# Patient Record
Sex: FEMALE | Race: OTHER | NOT HISPANIC OR LATINO
[De-identification: names, ages, dates, MRNs, and addresses within clinical notes are randomized per-mention and may not be internally consistent; named-entity substitution may affect disease eponyms.]

---

## 2017-12-26 PROBLEM — Z00.00 ENCOUNTER FOR PREVENTIVE HEALTH EXAMINATION: Status: ACTIVE | Noted: 2017-12-26

## 2018-01-18 ENCOUNTER — APPOINTMENT (OUTPATIENT)
Dept: OTOLARYNGOLOGY | Facility: CLINIC | Age: 63
End: 2018-01-18
Payer: COMMERCIAL

## 2018-01-18 VITALS
DIASTOLIC BLOOD PRESSURE: 87 MMHG | HEIGHT: 62 IN | BODY MASS INDEX: 25.58 KG/M2 | WEIGHT: 139 LBS | SYSTOLIC BLOOD PRESSURE: 133 MMHG | HEART RATE: 79 BPM

## 2018-01-18 DIAGNOSIS — Z78.9 OTHER SPECIFIED HEALTH STATUS: ICD-10-CM

## 2018-01-18 PROCEDURE — 31231 NASAL ENDOSCOPY DX: CPT

## 2018-01-18 PROCEDURE — 99204 OFFICE O/P NEW MOD 45 MIN: CPT | Mod: 25

## 2018-01-25 ENCOUNTER — FORM ENCOUNTER (OUTPATIENT)
Age: 63
End: 2018-01-25

## 2018-01-26 ENCOUNTER — OUTPATIENT (OUTPATIENT)
Dept: OUTPATIENT SERVICES | Facility: HOSPITAL | Age: 63
LOS: 1 days | End: 2018-01-26
Payer: COMMERCIAL

## 2018-01-26 ENCOUNTER — APPOINTMENT (OUTPATIENT)
Dept: CT IMAGING | Facility: CLINIC | Age: 63
End: 2018-01-26
Payer: COMMERCIAL

## 2018-01-26 DIAGNOSIS — R51 HEADACHE: ICD-10-CM

## 2018-01-26 PROCEDURE — 70486 CT MAXILLOFACIAL W/O DYE: CPT

## 2018-01-26 PROCEDURE — 70486 CT MAXILLOFACIAL W/O DYE: CPT | Mod: 26

## 2018-02-07 ENCOUNTER — APPOINTMENT (OUTPATIENT)
Dept: OTOLARYNGOLOGY | Facility: CLINIC | Age: 63
End: 2018-02-07

## 2018-03-15 ENCOUNTER — APPOINTMENT (OUTPATIENT)
Dept: NEUROLOGY | Facility: CLINIC | Age: 63
End: 2018-03-15

## 2020-01-26 ENCOUNTER — TRANSCRIPTION ENCOUNTER (OUTPATIENT)
Age: 65
End: 2020-01-26

## 2022-02-11 ENCOUNTER — APPOINTMENT (OUTPATIENT)
Dept: OTOLARYNGOLOGY | Facility: CLINIC | Age: 67
End: 2022-02-11
Payer: COMMERCIAL

## 2022-02-11 VITALS
HEIGHT: 61 IN | SYSTOLIC BLOOD PRESSURE: 122 MMHG | WEIGHT: 136 LBS | DIASTOLIC BLOOD PRESSURE: 79 MMHG | BODY MASS INDEX: 25.68 KG/M2 | HEART RATE: 80 BPM

## 2022-02-11 DIAGNOSIS — J35.01 CHRONIC TONSILLITIS: ICD-10-CM

## 2022-02-11 PROCEDURE — 99244 OFF/OP CNSLTJ NEW/EST MOD 40: CPT | Mod: 25

## 2022-02-11 PROCEDURE — 31231 NASAL ENDOSCOPY DX: CPT

## 2022-02-11 RX ORDER — HYDROCORTISONE 0.5 G/100G
0.5 CREAM TOPICAL
Refills: 0 | Status: DISCONTINUED | COMMUNITY
End: 2022-02-11

## 2022-02-11 RX ORDER — GABAPENTIN 300 MG/1
300 CAPSULE ORAL
Refills: 0 | Status: DISCONTINUED | COMMUNITY
End: 2022-02-11

## 2022-02-11 RX ORDER — METHYLPREDNISOLONE 4 MG/1
4 TABLET ORAL
Qty: 1 | Refills: 0 | Status: DISCONTINUED | COMMUNITY
Start: 2018-01-18 | End: 2022-02-11

## 2022-02-11 RX ORDER — ACYCLOVIR 400 MG/1
400 TABLET ORAL
Refills: 0 | Status: DISCONTINUED | COMMUNITY
End: 2022-02-11

## 2022-02-11 RX ORDER — MELOXICAM 15 MG/1
15 TABLET ORAL
Refills: 0 | Status: DISCONTINUED | COMMUNITY
End: 2022-02-11

## 2022-02-11 RX ORDER — MUPIROCIN 20 MG/G
2 OINTMENT TOPICAL
Refills: 0 | Status: DISCONTINUED | COMMUNITY
End: 2022-02-11

## 2022-02-11 RX ORDER — KETOCONAZOLE 20 MG/G
2 CREAM TOPICAL
Refills: 0 | Status: DISCONTINUED | COMMUNITY
End: 2022-02-11

## 2022-02-11 RX ORDER — PRAVASTATIN SODIUM 20 MG/1
20 TABLET ORAL
Refills: 0 | Status: DISCONTINUED | COMMUNITY
End: 2022-02-11

## 2022-02-11 NOTE — HISTORY OF PRESENT ILLNESS
[de-identified] : The patient presents today for sinus check up. Pt reports having clear mucus discharge from nose. Pt states seeing a neurologist for this issue and was told she should see an ENT because discharge might be because of her sinuses. Pt reports having MRI done recently but doesn’t have the report with her.

## 2022-02-11 NOTE — ADDENDUM
[FreeTextEntry1] : Documented by Saranya Moss acting as scribe for Dr. Ware on 02/11/2022. \par \par All Medical record entries made by the scribe were at my. Dr. Ware direction and personally dictated by me on 02/11/2022. I have reviewed the chart and agree that the record accurately reflects my personal performance of the history, physical exam, assessment and plan. I have also personally directed, reviewed, and agreed with the discharge instructions.

## 2022-02-11 NOTE — PHYSICAL EXAM
[Hearing Lieberman Test (Tuning Fork On Forehead)] : no lateralization of tone [Midline] : trachea located in midline position [Normal] : no abnormal secretions [Hearing Loss Right Only] : normal [Hearing Loss Left Only] : normal [FreeTextEntry6] : little irritation around lobule where her earing is  [FreeTextEntry1] : mild deviated septum\par mild inflamed turbs  [de-identified] : class 2 little cryptic  [FreeTextEntry2] : sinuses nontender to percussion sensations intact

## 2022-02-11 NOTE — CONSULT LETTER
[Dear  ___] : Dear  [unfilled], [Courtesy Letter:] : I had the pleasure of seeing your patient, [unfilled], in my office today. [Please see my note below.] : Please see my note below. [Consult Closing:] : Thank you very much for allowing me to participate in the care of this patient.  If you have any questions, please do not hesitate to contact me. [Sincerely,] : Sincerely, [FreeTextEntry3] : Salvatore Ware MD FACS

## 2022-02-11 NOTE — ASSESSMENT
[FreeTextEntry1] : Reviewed and reconciled medications, allergies, PMHx, PSHx, SocHx, FMHx. 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 \par \par Plan:\par Previous MRI and CT sinus reports discussed. Flexible nasal endoscopy. CT sinus ordered. FU after results\par \par \par \par \par

## 2022-02-11 NOTE — PROCEDURE
[Recalcitrant Symptoms] : recalcitrant symptoms  [FreeTextEntry6] : Procedure: Flexible Nasal Endoscopy: Risks, benefits, and alternatives of flexible endoscopy were explained to the patient. The patient gave oral consent to proceed. The flexible scope was inserted into the right nasal cavity. Endoscopy of the inferior and middle meatus was performed. Left side large middle turb, deviated septum pinching on the turb. Right side same thing large middle turb. Mildly inflamed lateral wall.No polyp, mass, or lesion was appreciated. Olfactory cleft was clear. Spheno-ethmoid recess is clear. Nasopharynx was clear. 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 The procedure was repeated on the contralateral side with similar findings.

## 2022-02-14 ENCOUNTER — APPOINTMENT (OUTPATIENT)
Dept: CT IMAGING | Facility: CLINIC | Age: 67
End: 2022-02-14
Payer: COMMERCIAL

## 2022-02-14 ENCOUNTER — RESULT REVIEW (OUTPATIENT)
Age: 67
End: 2022-02-14

## 2022-02-14 ENCOUNTER — OUTPATIENT (OUTPATIENT)
Dept: OUTPATIENT SERVICES | Facility: HOSPITAL | Age: 67
LOS: 1 days | End: 2022-02-14
Payer: COMMERCIAL

## 2022-02-14 DIAGNOSIS — R09.81 NASAL CONGESTION: ICD-10-CM

## 2022-02-14 DIAGNOSIS — R20.0 ANESTHESIA OF SKIN: ICD-10-CM

## 2022-02-14 DIAGNOSIS — J34.2 DEVIATED NASAL SEPTUM: ICD-10-CM

## 2022-02-14 PROCEDURE — 70486 CT MAXILLOFACIAL W/O DYE: CPT | Mod: 26

## 2022-02-14 PROCEDURE — 70486 CT MAXILLOFACIAL W/O DYE: CPT

## 2022-03-07 ENCOUNTER — APPOINTMENT (OUTPATIENT)
Dept: OTOLARYNGOLOGY | Facility: CLINIC | Age: 67
End: 2022-03-07
Payer: COMMERCIAL

## 2022-03-07 VITALS
SYSTOLIC BLOOD PRESSURE: 137 MMHG | WEIGHT: 135 LBS | HEIGHT: 61 IN | BODY MASS INDEX: 25.49 KG/M2 | HEART RATE: 77 BPM | DIASTOLIC BLOOD PRESSURE: 83 MMHG

## 2022-03-07 PROCEDURE — 99214 OFFICE O/P EST MOD 30 MIN: CPT

## 2022-03-07 NOTE — ADDENDUM
[FreeTextEntry1] : Documented by Fili Fan acting as a scribe for Dr. Salvatore Ware on (03/07/2022).\par \par All medical record entries made by the Scribe were at my, Dr. Salvatore Ware's, direction and personally dictated by me on (03/07/2022). I have reviewed the chart and agree that the record accurately reflects my personal performance of the history, physical exam, assessment and plan. I have also personally directed, reviewed, and agree with the discharge instructions.\par \par

## 2022-03-07 NOTE — HISTORY OF PRESENT ILLNESS
[de-identified] : The patient presents with continue clear mucoid discharge on her previous visit, s/p nasal endoscopy which noted max sinus through accessory osseum. Pt presents to discuss recent ct sinus results. Pt is currently using sinus rinse regularly.

## 2022-03-07 NOTE — ASSESSMENT
[FreeTextEntry1] : Reviewed and reconciled medications, allergies, PMHx, PSHx, SocHx, FMHx.\par \par \par CT sinus 2/14/2022: Mild mucosal thickening of the paranasal sinuses. Nasal septal deviation to the left. Narrowing of the frontal recesses which may predispose to sinus outflow obstruction. no polyps or growths. \par \par Plan:\par  No sign on sinus issues to attributed to discharge as per Sinus CT. discussed accessory osseum for mucoid discharge. Recommended use of sinus rinses and nasal sprays to control swelling and inflammation. Discussed r/b/a of accessory and natural osseum surgery- to be considered after use of sinus rinse and nasal sprays. Sinus rinse - QAM and QPM after dinner. Dymista - 1 spray bilaterally BID, spray laterally. FU 3-4 weeks.\par  \par

## 2022-04-04 ENCOUNTER — RX RENEWAL (OUTPATIENT)
Age: 67
End: 2022-04-04

## 2022-04-04 RX ORDER — AZELASTINE HYDROCHLORIDE AND FLUTICASONE PROPIONATE 137; 50 UG/1; UG/1
137-50 SPRAY, METERED NASAL
Qty: 69 | Refills: 0 | Status: ACTIVE | COMMUNITY
Start: 2022-03-07 | End: 1900-01-01

## 2022-05-13 ENCOUNTER — APPOINTMENT (OUTPATIENT)
Dept: OTOLARYNGOLOGY | Facility: CLINIC | Age: 67
End: 2022-05-13
Payer: COMMERCIAL

## 2022-05-13 VITALS
HEART RATE: 90 BPM | SYSTOLIC BLOOD PRESSURE: 134 MMHG | WEIGHT: 135 LBS | BODY MASS INDEX: 25.49 KG/M2 | DIASTOLIC BLOOD PRESSURE: 82 MMHG | HEIGHT: 61 IN

## 2022-05-13 DIAGNOSIS — R09.81 NASAL CONGESTION: ICD-10-CM

## 2022-05-13 PROCEDURE — 99214 OFFICE O/P EST MOD 30 MIN: CPT

## 2022-05-13 RX ORDER — CARBAMAZEPINE 100 MG/1
100 TABLET, EXTENDED RELEASE ORAL
Qty: 60 | Refills: 0 | Status: COMPLETED | COMMUNITY
Start: 2022-03-03 | End: 2022-05-13

## 2022-05-13 NOTE — ADDENDUM
[FreeTextEntry1] : Documented by Saranya Moss acting as scribe for Dr. Ware on 05/13/2022. \par \par All Medical record entries made by the scribe were at my. Dr. Ware direction and personally dictated by me on 05/13/2022. I have reviewed the chart and agree that the record accurately reflects my personal performance of the history, physical exam, assessment and plan. I have also personally directed, reviewed, and agreed with the discharge instructions. 
yes

## 2022-05-13 NOTE — ASSESSMENT
[FreeTextEntry1] : Reviewed and reconciled medications, allergies, PMHx, PSHx, SocHx, FMHx. \par \par CT sinus 2/14/2022: Mild mucosal thickening of the paranasal sinuses. Nasal septal deviation to the left. Narrowing of the frontal recesses which may predispose to sinus outflow obstruction. no polyps or growths.  \par \par Plan:\par Discussed r/b/a of septum, turb and maxillary (accessory and natural osseum surgery). Atrovent - 1 or 2 sprays bilaterally up to QID, spray laterally - if that doesn’t work then surgery is required. prior CT sinus results discussed. FU 1 month

## 2022-05-13 NOTE — PHYSICAL EXAM
[Midline] : trachea located in midline position [Normal] : no masses and lesions seen, face is symmetric [FreeTextEntry1] : deviated septum b/l \par inflamed turbs b/l  [de-identified] : class 2  [FreeTextEntry2] : sinuses nontender to percussion

## 2022-05-13 NOTE — HISTORY OF PRESENT ILLNESS
[de-identified] : The patient presents today for follow up on nasal discharge. Pt reports her nose has not improved from dymista and she is still having discharge. Pt stopped using dymista last week because when she was blowing her nose blood was coming out.

## 2022-06-20 ENCOUNTER — APPOINTMENT (OUTPATIENT)
Dept: OTOLARYNGOLOGY | Facility: CLINIC | Age: 67
End: 2022-06-20
Payer: COMMERCIAL

## 2022-06-20 VITALS
DIASTOLIC BLOOD PRESSURE: 86 MMHG | WEIGHT: 135 LBS | HEIGHT: 61 IN | BODY MASS INDEX: 25.49 KG/M2 | HEART RATE: 80 BPM | SYSTOLIC BLOOD PRESSURE: 141 MMHG

## 2022-06-20 PROCEDURE — 99213 OFFICE O/P EST LOW 20 MIN: CPT

## 2022-06-20 NOTE — ASSESSMENT
[FreeTextEntry1] : Reviewed and reconciled medications, allergies, PMHx, PSHx, SocHx, FMHx.\par \par h/o nasal discharge - pt reports it is 80% better. \par \par Plan:\par Continue Atrovent - 1 or 2 sprays bilaterally up to QID, spray laterally. FU 1 year. \par \par

## 2022-06-20 NOTE — PHYSICAL EXAM
[Midline] : trachea located in midline position [Normal] : orientation to person, place, and time: normal [FreeTextEntry1] : deviated septum\par enlarged turbs [de-identified] : class 2

## 2022-06-20 NOTE — HISTORY OF PRESENT ILLNESS
[de-identified] : Pt presents with previously coming in for nasal discharge. Pt presents today feeling much better. Pt reports nasal discharge has gotten much better. Pt reports using Atrovent nasal spray once a day.

## 2022-06-20 NOTE — ADDENDUM
[FreeTextEntry1] : Documented by Carmen Chambers acting as scribe for Dr. Ware on 06/20/2022.\par \par All Medical record entries made by the scribe were at my, Dr. Ware,direction and personally dictated by me on 06/20/2022. I have reviewed the chart and agree that the record accurately reflects my personal performance of the history, physical exam, assessment and plan. I have also personally directed, reviewed, and agreed with the discharge instructions.

## 2022-12-16 ENCOUNTER — NON-APPOINTMENT (OUTPATIENT)
Age: 67
End: 2022-12-16

## 2022-12-16 ENCOUNTER — APPOINTMENT (OUTPATIENT)
Dept: OTOLARYNGOLOGY | Facility: CLINIC | Age: 67
End: 2022-12-16

## 2022-12-16 VITALS
HEIGHT: 61 IN | WEIGHT: 136 LBS | BODY MASS INDEX: 25.68 KG/M2 | SYSTOLIC BLOOD PRESSURE: 136 MMHG | HEART RATE: 82 BPM | DIASTOLIC BLOOD PRESSURE: 82 MMHG

## 2022-12-16 DIAGNOSIS — J34.89 NASAL CONGESTION: ICD-10-CM

## 2022-12-16 DIAGNOSIS — J34.2 DEVIATED NASAL SEPTUM: ICD-10-CM

## 2022-12-16 DIAGNOSIS — J31.0 CHRONIC RHINITIS: ICD-10-CM

## 2022-12-16 DIAGNOSIS — R09.81 NASAL CONGESTION: ICD-10-CM

## 2022-12-16 PROCEDURE — 31231 NASAL ENDOSCOPY DX: CPT

## 2022-12-16 PROCEDURE — 99213 OFFICE O/P EST LOW 20 MIN: CPT | Mod: 25

## 2022-12-16 NOTE — ASSESSMENT
[FreeTextEntry1] : Reviewed and reconciled medications, allergies, PMHx, PSHx, SocHx, FMHx. \par \par The patient presents today for PND\par \par physical exam - \par mildly deviated septum \par \par Flexible nasal endoscopy - \par all clear to the nasopharynx b/l \par \par pt showed the tissue she has been cleaning her nose with and it doesn’t contain any dry mucus spots to confirm CSF. The tissue is clear and dry \par \par plan:\par Flexible nasal endoscopy. take a tissue and left nose drip on it - if the mucus drys and clear then its not CSF. start using atrovent 4 times a day. MRI ordered. Discussed r/b/a of rhinair - can be done if atrovent helps. FU after MRI

## 2022-12-16 NOTE — HISTORY OF PRESENT ILLNESS
[de-identified] : The patient presents today for PND. Pt reports she stopped using atrovent since it wasn’t helping anymore. Pt thinks her symptoms may be CSF

## 2022-12-16 NOTE — PHYSICAL EXAM
[Midline] : trachea located in midline position [Normal] : no masses and lesions seen, face is symmetric [FreeTextEntry1] : mildly deviated septum  [FreeTextEntry2] : sinuses nontender to percussion

## 2022-12-16 NOTE — ADDENDUM
[FreeTextEntry1] : Documented by Saranya Moss acting as scribe for Dr. Ware on 12/16/2022. \par \par All Medical record entries made by the scribe were at my. Dr. Ware direction and personally dictated by me on 12/16/2022. I have reviewed the chart and agree that the record accurately reflects my personal performance of the history, physical exam, assessment and plan. I have also personally directed, reviewed, and agreed with the discharge instructions.

## 2022-12-16 NOTE — PROCEDURE
[None] : none [FreeTextEntry6] : Procedure: Flexible Nasal Endoscopy: Risks, benefits, and alternatives of flexible endoscopy were explained to the patient. The patient gave oral consent to proceed. The flexible scope was inserted into the right nasal cavity. Endoscopy of the inferior and middle meatus was performed. No polyp, mass, or lesion was appreciated. Olfactory cleft was clear. Spheno-ethmoid recess is clear. all clear to the nasopharynx b/l . Nasopharynx was clear. Turbinates were without mass. The procedure was repeated on the contralateral side with similar findings.  [de-identified] : PND

## 2023-01-19 ENCOUNTER — RX RENEWAL (OUTPATIENT)
Age: 68
End: 2023-01-19

## 2023-01-30 ENCOUNTER — RX RENEWAL (OUTPATIENT)
Age: 68
End: 2023-01-30

## 2023-02-13 ENCOUNTER — RX RENEWAL (OUTPATIENT)
Age: 68
End: 2023-02-13

## 2023-02-22 ENCOUNTER — RX RENEWAL (OUTPATIENT)
Age: 68
End: 2023-02-22

## 2023-03-13 ENCOUNTER — RX RENEWAL (OUTPATIENT)
Age: 68
End: 2023-03-13

## 2023-03-27 ENCOUNTER — RX RENEWAL (OUTPATIENT)
Age: 68
End: 2023-03-27

## 2023-04-04 ENCOUNTER — RX RENEWAL (OUTPATIENT)
Age: 68
End: 2023-04-04

## 2023-05-22 ENCOUNTER — RX RENEWAL (OUTPATIENT)
Age: 68
End: 2023-05-22

## 2023-05-22 RX ORDER — IPRATROPIUM BROMIDE 42 UG/1
0.06 SPRAY NASAL
Qty: 15 | Refills: 0 | Status: ACTIVE | COMMUNITY
Start: 2022-05-13 | End: 1900-01-01

## 2023-07-18 ENCOUNTER — NON-APPOINTMENT (OUTPATIENT)
Age: 68
End: 2023-07-18

## 2023-07-19 ENCOUNTER — APPOINTMENT (OUTPATIENT)
Dept: NEUROSURGERY | Facility: CLINIC | Age: 68
End: 2023-07-19
Payer: COMMERCIAL

## 2023-07-19 VITALS
HEIGHT: 61 IN | SYSTOLIC BLOOD PRESSURE: 131 MMHG | WEIGHT: 135 LBS | DIASTOLIC BLOOD PRESSURE: 96 MMHG | HEART RATE: 81 BPM | BODY MASS INDEX: 25.49 KG/M2 | OXYGEN SATURATION: 98 %

## 2023-07-19 DIAGNOSIS — R51.9 HEADACHE, UNSPECIFIED: ICD-10-CM

## 2023-07-19 PROCEDURE — 99203 OFFICE O/P NEW LOW 30 MIN: CPT

## 2023-07-19 NOTE — CONSULT LETTER
[Dear  ___] : Dear  [unfilled], [Courtesy Letter:] : I had the pleasure of seeing your patient, [unfilled], in my office today. [Sincerely,] : Sincerely, [FreeTextEntry2] : Pedro Pablo Romeo MD\par [address]\[place] [address] [FreeTextEntry3] : Boston He MD, PhD, FRCPSC \par Attending Neurosurgeon \par  of Neurosurgery \par [facility] \par [address], [address]nd floor \[place] [address] \par Office: [phone] \par Fax: [phone]\par \par

## 2023-07-21 RX ORDER — CARBAMAZEPINE 100 MG/1
100 TABLET, EXTENDED RELEASE ORAL
Qty: 60 | Refills: 1 | Status: ACTIVE | COMMUNITY
Start: 2023-07-21 | End: 1900-01-01

## 2023-08-31 ENCOUNTER — APPOINTMENT (OUTPATIENT)
Dept: NEUROSURGERY | Facility: CLINIC | Age: 68
End: 2023-08-31
Payer: COMMERCIAL

## 2023-08-31 VITALS
HEART RATE: 81 BPM | OXYGEN SATURATION: 97 % | SYSTOLIC BLOOD PRESSURE: 131 MMHG | BODY MASS INDEX: 25.68 KG/M2 | WEIGHT: 136 LBS | DIASTOLIC BLOOD PRESSURE: 86 MMHG | HEIGHT: 61 IN

## 2023-08-31 DIAGNOSIS — R20.8 OTHER DISTURBANCES OF SKIN SENSATION: ICD-10-CM

## 2023-08-31 DIAGNOSIS — R20.0 ANESTHESIA OF SKIN: ICD-10-CM

## 2023-08-31 DIAGNOSIS — G50.0 TRIGEMINAL NEURALGIA: ICD-10-CM

## 2023-08-31 PROCEDURE — 99214 OFFICE O/P EST MOD 30 MIN: CPT

## 2023-08-31 NOTE — CONSULT LETTER
[Dear  ___] : Dear  [unfilled], [Courtesy Letter:] : I had the pleasure of seeing your patient, [unfilled], in my office today. [Sincerely,] : Sincerely, [FreeTextEntry2] : [place] [address]   [FreeTextEntry3] : Boston He MD, PhD, FRCPSC                            Attending Neurosurgeon   of Neurosurgery  [facility]  [address], [address]nd [place] [address]  Office: [phone]  Fax: [phone]

## 2023-09-24 PROBLEM — R20.8 FACIAL BURNING: Status: ACTIVE | Noted: 2018-01-18

## 2023-09-24 PROBLEM — G50.0 TRIGEMINAL NEURALGIA OF LEFT SIDE OF FACE: Status: ACTIVE | Noted: 2023-09-24

## 2023-09-24 PROBLEM — R20.0 NUMBNESS OF TONGUE: Status: ACTIVE | Noted: 2018-01-18

## 2023-09-24 PROBLEM — R20.0 FACIAL NUMBNESS: Status: ACTIVE | Noted: 2018-01-18

## 2023-09-24 PROBLEM — R51.9 FACIAL PAIN: Status: ACTIVE | Noted: 2018-01-18
